# Patient Record
Sex: MALE | Race: WHITE | NOT HISPANIC OR LATINO | Employment: OTHER | URBAN - METROPOLITAN AREA
[De-identification: names, ages, dates, MRNs, and addresses within clinical notes are randomized per-mention and may not be internally consistent; named-entity substitution may affect disease eponyms.]

---

## 2019-06-07 ENCOUNTER — OFFICE VISIT CONVERTED (OUTPATIENT)
Dept: UROLOGY | Facility: CLINIC | Age: 64
End: 2019-06-07
Attending: UROLOGY

## 2019-06-07 ENCOUNTER — HOSPITAL ENCOUNTER (OUTPATIENT)
Dept: UROLOGY | Facility: CLINIC | Age: 64
Discharge: HOME | End: 2019-06-07
Attending: UROLOGY

## 2019-06-09 LAB — BACTERIA UR CULT: NORMAL

## 2019-06-10 ENCOUNTER — HOSPITAL ENCOUNTER (OUTPATIENT)
Dept: OTHER | Facility: HOSPITAL | Age: 64
Discharge: HOME | End: 2019-06-10
Attending: UROLOGY

## 2019-06-10 LAB
ALBUMIN SERPL-MCNC: 4 G/DL (ref 3.5–5)
ALBUMIN/GLOB SERPL: 1.3 {RATIO} (ref 1.4–2.6)
ALP SERPL-CCNC: 56 U/L (ref 56–155)
ALT SERPL-CCNC: 7 U/L (ref 10–40)
ANION GAP SERPL CALC-SCNC: 11 MMOL/L (ref 8–19)
AST SERPL-CCNC: 11 U/L (ref 15–50)
BASOPHILS # BLD AUTO: 0.08 10*3/UL (ref 0–0.2)
BASOPHILS NFR BLD AUTO: 0.8 % (ref 0–3)
BILIRUB SERPL-MCNC: 0.28 MG/DL (ref 0.2–1.3)
BUN SERPL-MCNC: 16 MG/DL (ref 5–25)
BUN/CREAT SERPL: 12 {RATIO} (ref 6–20)
CALCIUM SERPL-MCNC: 8.8 MG/DL (ref 8.7–10.4)
CHLORIDE SERPL-SCNC: 106 MMOL/L (ref 99–111)
CONV ABS IMM GRAN: 0.03 10*3/UL (ref 0–0.2)
CONV CO2: 26 MMOL/L (ref 22–32)
CONV IMMATURE GRAN: 0.3 % (ref 0–1.8)
CONV TOTAL PROTEIN: 7 G/DL (ref 6.3–8.2)
CREAT UR-MCNC: 1.35 MG/DL (ref 0.7–1.2)
DEPRECATED RDW RBC AUTO: 49.1 FL (ref 35.1–43.9)
EOSINOPHIL # BLD AUTO: 0.25 10*3/UL (ref 0–0.7)
EOSINOPHIL # BLD AUTO: 2.7 % (ref 0–7)
ERYTHROCYTE [DISTWIDTH] IN BLOOD BY AUTOMATED COUNT: 14 % (ref 11.6–14.4)
GFR SERPLBLD BASED ON 1.73 SQ M-ARVRAT: 55 ML/MIN/{1.73_M2}
GLOBULIN UR ELPH-MCNC: 3 G/DL (ref 2–3.5)
GLUCOSE SERPL-MCNC: 110 MG/DL (ref 70–99)
HBA1C MFR BLD: 14.3 G/DL (ref 14–18)
HCT VFR BLD AUTO: 42.3 % (ref 42–52)
LYMPHOCYTES # BLD AUTO: 2.54 10*3/UL (ref 1–5)
MCH RBC QN AUTO: 31.9 PG (ref 27–31)
MCHC RBC AUTO-ENTMCNC: 33.8 G/DL (ref 33–37)
MCV RBC AUTO: 94.4 FL (ref 80–96)
MONOCYTES # BLD AUTO: 0.78 10*3/UL (ref 0.2–1.2)
MONOCYTES NFR BLD AUTO: 8.3 % (ref 3–10)
NEUTROPHILS # BLD AUTO: 5.74 10*3/UL (ref 2–8)
NEUTROPHILS NFR BLD AUTO: 60.9 % (ref 30–85)
NRBC CBCN: 0 % (ref 0–0.7)
OSMOLALITY SERPL CALC.SUM OF ELEC: 290 MOSM/KG (ref 273–304)
PLATELET # BLD AUTO: 239 10*3/UL (ref 130–400)
PMV BLD AUTO: 9.3 FL (ref 9.4–12.4)
POTASSIUM SERPL-SCNC: 4.4 MMOL/L (ref 3.5–5.3)
PTH-INTACT SERPL-MCNC: 37.8 PG/ML (ref 11.1–79.5)
RBC # BLD AUTO: 4.48 10*6/UL (ref 4.7–6.1)
SODIUM SERPL-SCNC: 139 MMOL/L (ref 135–147)
URATE SERPL-MCNC: 6.4 MG/DL (ref 3.5–8.5)
VARIANT LYMPHS NFR BLD MANUAL: 27 % (ref 20–45)
WBC # BLD AUTO: 9.42 10*3/UL (ref 4.8–10.8)

## 2021-05-15 VITALS
BODY MASS INDEX: 31.5 KG/M2 | TEMPERATURE: 98 F | SYSTOLIC BLOOD PRESSURE: 143 MMHG | HEART RATE: 82 BPM | DIASTOLIC BLOOD PRESSURE: 86 MMHG | WEIGHT: 220 LBS | HEIGHT: 70 IN

## 2021-09-21 ENCOUNTER — OFFICE VISIT (OUTPATIENT)
Dept: CARDIOLOGY | Facility: CLINIC | Age: 66
End: 2021-09-21

## 2021-09-21 VITALS
SYSTOLIC BLOOD PRESSURE: 140 MMHG | HEART RATE: 84 BPM | BODY MASS INDEX: 32.64 KG/M2 | DIASTOLIC BLOOD PRESSURE: 76 MMHG | WEIGHT: 228 LBS | HEIGHT: 70 IN

## 2021-09-21 DIAGNOSIS — Z72.0 TOBACCO USE: ICD-10-CM

## 2021-09-21 DIAGNOSIS — Z98.61 CAD S/P PERCUTANEOUS CORONARY ANGIOPLASTY: ICD-10-CM

## 2021-09-21 DIAGNOSIS — R55 SYNCOPE AND COLLAPSE: ICD-10-CM

## 2021-09-21 DIAGNOSIS — R07.2 CHEST PAIN, PRECORDIAL: Primary | ICD-10-CM

## 2021-09-21 DIAGNOSIS — I25.10 CAD S/P PERCUTANEOUS CORONARY ANGIOPLASTY: ICD-10-CM

## 2021-09-21 PROBLEM — I10 HYPERTENSION: Status: ACTIVE | Noted: 2021-09-21

## 2021-09-21 PROBLEM — I65.29 CAROTID STENOSIS: Status: ACTIVE | Noted: 2021-09-21

## 2021-09-21 PROBLEM — J43.1 PANLOBULAR EMPHYSEMA: Status: ACTIVE | Noted: 2021-09-21

## 2021-09-21 PROBLEM — E78.5 HYPERLIPIDEMIA LDL GOAL <70: Status: ACTIVE | Noted: 2021-09-21

## 2021-09-21 PROBLEM — E66.9 CLASS 1 OBESITY: Status: ACTIVE | Noted: 2021-09-21

## 2021-09-21 PROCEDURE — 99204 OFFICE O/P NEW MOD 45 MIN: CPT | Performed by: INTERNAL MEDICINE

## 2021-09-21 PROCEDURE — 93000 ELECTROCARDIOGRAM COMPLETE: CPT | Performed by: INTERNAL MEDICINE

## 2021-09-21 RX ORDER — ALBUTEROL SULFATE 90 UG/1
2 AEROSOL, METERED RESPIRATORY (INHALATION)
COMMUNITY

## 2021-09-21 RX ORDER — CARVEDILOL 25 MG/1
25 TABLET ORAL 2 TIMES DAILY WITH MEALS
COMMUNITY
Start: 2021-08-05

## 2021-09-21 RX ORDER — IPRATROPIUM BROMIDE AND ALBUTEROL SULFATE 2.5; .5 MG/3ML; MG/3ML
3 SOLUTION RESPIRATORY (INHALATION)
COMMUNITY

## 2021-09-21 RX ORDER — SIMVASTATIN 80 MG
80 TABLET ORAL DAILY
COMMUNITY
Start: 2021-06-30

## 2021-09-21 RX ORDER — TIAGABINE HYDROCHLORIDE 2 MG/1
2 TABLET, FILM COATED ORAL DAILY
COMMUNITY
Start: 2021-08-01 | End: 2023-03-22 | Stop reason: ALTCHOICE

## 2021-09-21 RX ORDER — TRIAMCINOLONE ACETONIDE 1 MG/ML
LOTION TOPICAL
COMMUNITY

## 2021-09-21 RX ORDER — LISINOPRIL 30 MG/1
30 TABLET ORAL DAILY
COMMUNITY
Start: 2021-08-01 | End: 2023-03-22

## 2021-09-21 RX ORDER — TIOTROPIUM BROMIDE INHALATION SPRAY 3.12 UG/1
SPRAY, METERED RESPIRATORY (INHALATION)
COMMUNITY
Start: 2021-09-03 | End: 2023-03-22

## 2021-09-21 RX ORDER — CYCLOBENZAPRINE HCL 10 MG
5-10 TABLET ORAL 3 TIMES DAILY PRN
COMMUNITY
Start: 2021-07-02

## 2021-09-21 RX ORDER — AMITRIPTYLINE HYDROCHLORIDE 100 MG/1
100 TABLET, FILM COATED ORAL
COMMUNITY
Start: 2021-06-30

## 2021-09-21 RX ORDER — OMEPRAZOLE 20 MG/1
20 CAPSULE, DELAYED RELEASE ORAL DAILY
COMMUNITY
Start: 2021-06-30

## 2021-09-21 RX ORDER — ASPIRIN 81 MG/1
81 TABLET ORAL DAILY
COMMUNITY

## 2021-09-21 RX ORDER — NYSTATIN 100000 U/G
CREAM TOPICAL
COMMUNITY

## 2021-09-21 RX ORDER — NITROGLYCERIN 0.4 MG/1
0.4 TABLET SUBLINGUAL AS NEEDED
COMMUNITY

## 2021-09-21 RX ORDER — ERGOCALCIFEROL 1.25 MG/1
50000 CAPSULE ORAL
COMMUNITY

## 2021-09-21 RX ORDER — PREDNISOLONE ACETATE 10 MG/ML
SUSPENSION/ DROPS OPHTHALMIC
COMMUNITY
Start: 2021-08-02

## 2021-09-21 RX ORDER — BUSPIRONE HYDROCHLORIDE 15 MG/1
TABLET ORAL
COMMUNITY
Start: 2021-06-30

## 2021-09-21 RX ORDER — IBUPROFEN 800 MG/1
800 TABLET ORAL 3 TIMES DAILY PRN
COMMUNITY
Start: 2021-08-01

## 2021-09-21 NOTE — ASSESSMENT & PLAN NOTE
On high intensity Zocor with no myalgias symptoms will repeat lipids LFTs goal LDL less than seventy continue with his current dosing for the time being

## 2021-09-21 NOTE — ASSESSMENT & PLAN NOTE
Episode sounds vasovagal in nature will get echo and 24-hour Holter monitor for dysrhythmia assessment

## 2021-09-21 NOTE — ASSESSMENT & PLAN NOTE
Counseled on cessation patient was not interested in trying at this point to give a handout regarding information

## 2021-09-21 NOTE — PATIENT INSTRUCTIONS
Managing the Challenge of Quitting Smoking  Quitting smoking is a physical and mental challenge. You will face cravings, withdrawal symptoms, and temptation. Before quitting, work with your health care provider to make a plan that can help you manage quitting. Preparation can help you quit and keep you from giving in.  How to manage lifestyle changes  Managing stress  Stress can make you want to smoke, and wanting to smoke may cause stress. It is important to find ways to manage your stress. You might try some of the following:  · Practice relaxation techniques.  ? Breathe slowly and deeply, in through your nose and out through your mouth.  ? Listen to music.  ? Soak in a bath or take a shower.  ? Imagine a peaceful place or vacation.  · Get some support.  ? Talk with family or friends about your stress.  ? Join a support group.  ? Talk with a counselor or therapist.  · Get some physical activity.  ? Go for a walk, run, or bike ride.  ? Play a favorite sport.  ? Practice yoga.    Medicines  Talk with your health care provider about medicines that might help you deal with cravings and make quitting easier for you.  Relationships  Social situations can be difficult when you are quitting smoking. To manage this, you can:  · Avoid parties and other social situations where people might be smoking.  · Avoid alcohol.  · Leave right away if you have the urge to smoke.  · Explain to your family and friends that you are quitting smoking. Ask for support and let them know you might be a bit grumpy.  · Plan activities where smoking is not an option.  General instructions  Be aware that many people gain weight after they quit smoking. However, not everyone does. To keep from gaining weight, have a plan in place before you quit and stick to the plan after you quit. Your plan should include:  · Having healthy snacks. When you have a craving, it may help to:  ? Eat popcorn, carrots, celery, or other cut vegetables.  ? Chew  sugar-free gum.  · Changing how you eat.  ? Eat small portion sizes at meals.  ? Eat 4-6 small meals throughout the day instead of 1-2 large meals a day.  ? Be mindful when you eat. Do not watch television or do other things that might distract you as you eat.  · Exercising regularly.  ? Make time to exercise each day. If you do not have time for a long workout, do short bouts of exercise for 5-10 minutes several times a day.  ? Do some form of strengthening exercise, such as weight lifting.  ? Do some exercise that gets your heart beating and causes you to breathe deeply, such as walking fast, running, swimming, or biking. This is very important.  · Drinking plenty of water or other low-calorie or no-calorie drinks. Drink 6-8 glasses of water daily.    How to recognize withdrawal symptoms  Your body and mind may experience discomfort as you try to get used to not having nicotine in your system. These effects are called withdrawal symptoms. They may include:  · Feeling hungrier than normal.  · Having trouble concentrating.  · Feeling irritable or restless.  · Having trouble sleeping.  · Feeling depressed.  · Craving a cigarette.  To manage withdrawal symptoms:  · Avoid places, people, and activities that trigger your cravings.  · Remember why you want to quit.  · Get plenty of sleep.  · Avoid coffee and other caffeinated drinks. These may worsen some of your symptoms.  These symptoms may surprise you. But be assured that they are normal to have when quitting smoking.  How to manage cravings  Come up with a plan for how to deal with your cravings. The plan should include the following:  · A definition of the specific situation you want to deal with.  · An alternative action you will take.  · A clear idea for how this action will help.  · The name of someone who might help you with this.  Cravings usually last for 5-10 minutes. Consider taking the following actions to help you with your plan to deal with  cravings:  · Keep your mouth busy.  ? Chew sugar-free gum.  ? Suck on hard candies or a straw.  ? Brush your teeth.  · Keep your hands and body busy.  ? Change to a different activity right away.  ? Squeeze or play with a ball.  ? Do an activity or a hobby, such as making bead jewelry, practicing needlepoint, or working with wood.  ? Mix up your normal routine.  ? Take a short exercise break. Go for a quick walk or run up and down stairs.  · Focus on doing something kind or helpful for someone else.  · Call a friend or family member to talk during a craving.  · Join a support group.  · Contact a quitline.  Where to find support  To get help or find a support group:  · Call the National Cancer [place]'s Smoking Quitline: 6-394-QUIT NOW (038-0698)  · Visit the website of the Substance Abuse and Mental Health Services Administration: www.samhsa.gov  · Text QUIT to SmokefreeTXT: 575608  Where to find more information  Visit these websites to find more information on quitting smoking:  · [employer] [place]: www.smokefree.gov  · American Lung Association: www.lung.org  · American Cancer Society: www.cancer.org  · Centers for Disease Control and Prevention: www.cdc.gov  · American Heart Association: www.heart.org  Contact a health care provider if:  · You want to change your plan for quitting.  · The medicines you are taking are not helping.  · Your eating feels out of control or you cannot sleep.  Get help right away if:  · You feel depressed or become very anxious.  Summary  · Quitting smoking is a physical and mental challenge. You will face cravings, withdrawal symptoms, and temptation to smoke again. Preparation can help you as you go through these challenges.  · Try different techniques to manage stress, handle social situations, and prevent weight gain.  · You can deal with cravings by keeping your mouth busy (such as by chewing gum), keeping your hands and body busy, calling family or friends, or  contacting a quitline for people who want to quit smoking.  · You can deal with withdrawal symptoms by avoiding places where people smoke, getting plenty of rest, and avoiding drinks with caffeine.  This information is not intended to replace advice given to you by your health care provider. Make sure you discuss any questions you have with your health care provider.  Document Revised: 10/06/2020 Document Reviewed: 10/06/2020  Elsevier Patient Education © 2021 [employer]

## 2021-09-21 NOTE — PROGRESS NOTES
Chief Complaint  Chest Pain (here and there), Syncope (did not go to ER), and Coronary Artery Disease      History of Present Illness  Jf Gonzáles presents to [facility] CARDIOLOGY  Patient is a 66-year-old white male with CAD and prior stenting who has had intermittent chest discomfort left-sided described as a tightness last for a few minutes does have improvement with 1-2 nitroglycerin.  Overall the patient feels that patient pain is infrequent can occur both with and without activities.  He had an episode earlier this week in which she while sitting but like he was going to pass out okay.  This occurred while the patient was using the restroom when he got up to leave in the open the door he lost consciousness    Past Medical History:   Diagnosis Date   • Anxiety    • Asthma    • Back pain    • Cardiovascular disease    • Carotid stenosis    • Coronary artery disease    • Depression    • Hyperlipidemia    • Hypertension    • Myocardial infarction (CMS/HCC)    • Neck pain    • Renal stones    • Sleep apnea          Current Outpatient Medications:   •  albuterol sulfate  (90 Base) MCG/ACT inhaler, Inhale 2 puffs., Disp: , Rfl:   •  amitriptyline (ELAVIL) 100 MG tablet, Take 100 mg by mouth every night at bedtime., Disp: , Rfl:   •  aspirin 81 MG EC tablet, Take 81 mg by mouth Daily., Disp: , Rfl:   •  Breo Ellipta 100-25 MCG/INH inhaler, USE ONE INHALATION BY MOUTH DAILY INTO LUNGS AS DIRECTED...DISCARD 42 DAYS AFTER FOIL OPENED EVERY EVENING, Disp: , Rfl:   •  busPIRone (BUSPAR) 15 MG tablet, 4 (Four) Times a Day., Disp: , Rfl:   •  carvedilol (COREG) 25 MG tablet, Take 25 mg by mouth 2 (Two) Times a Day With Meals., Disp: , Rfl:   •  cyclobenzaprine (FLEXERIL) 10 MG tablet, Take 5-10 mg by mouth 3 (Three) Times a Day As Needed., Disp: , Rfl:   •  ergocalciferol (ERGOCALCIFEROL) 1.25 MG (92505 UT) capsule, Take 50,000 Units by mouth Every 7 (Seven) Days., Disp: , Rfl:   •  ibuprofen  "(ADVIL,MOTRIN) 800 MG tablet, Take 800 mg by mouth 3 (Three) Times a Day As Needed., Disp: , Rfl:   •  ipratropium-albuterol (DUO-NEB) 0.5-2.5 mg/3 ml nebulizer, Inhale 3 mL., Disp: , Rfl:   •  lisinopril (PRINIVIL,ZESTRIL) 30 MG tablet, Take 30 mg by mouth Daily., Disp: , Rfl:   •  nitroglycerin (NITROSTAT) 0.4 MG SL tablet, Place 0.4 mg under the tongue As Needed., Disp: , Rfl:   •  nystatin (MYCOSTATIN) 422062 UNIT/GM cream, Apply  topically to the appropriate area as directed., Disp: , Rfl:   •  omeprazole (priLOSEC) 20 MG capsule, Take 20 mg by mouth Daily., Disp: , Rfl:   •  prednisoLONE acetate (PRED FORTE) 1 % ophthalmic suspension, INSTILL 1 DROP IN LEFT EYE FOUR TIMES A DAY; SHAKE WELL, Disp: , Rfl:   •  simvastatin (ZOCOR) 80 MG tablet, Take 80 mg by mouth Daily., Disp: , Rfl:   •  Spiriva Respimat 2.5 MCG/ACT aerosol solution inhaler, INHALE TWO PUFFS BY MOUTH INTO LUNGS EVERY MORNING **USE *BREO EVERY EVENING, Disp: , Rfl:   •  tiaGABine (GABITRIL) 2 MG tablet, Take 2 mg by mouth Daily., Disp: , Rfl:   •  triamcinolone (KENALOG) 0.1 % lotion, triamcinolone acetonide 0.1 % topical lotion apply a thin layer to the affected area(s) by topical route 2 times per day   Active, Disp: , Rfl:     There are no discontinued medications.  Allergies   Allergen Reactions   • Valproic Acid Headache     Makes him mean        Social History     Tobacco Use   • Smoking status: Current Every Day Smoker     Packs/day: 1.50     Types: Cigarettes     Start date: 1966   • Smokeless tobacco: Never Used   • Tobacco comment: 1-2 PPD   Vaping Use   • Vaping Use: Never used   Substance Use Topics   • Alcohol use: Never   • Drug use: Never       Family History   Problem Relation Age of Onset   • Heart attack Mother    • Dementia Mother    • Heart disease Father    • Stroke Father    • Aneurysm Father    • Hypertension Father    • Cancer Father         Objective     /76   Pulse 84   Ht 177.8 cm (70\")   Wt 103 kg (228 lb)   " BMI 32.71 kg/m²       Physical Exam    General Appearance:   · no acute distress  · Alert and oriented x3  HENT:   · lips not cyanotic  · Atraumatic  Neck:  · No jvd   · supple  Respiratory:  · no respiratory distress  · normal breath sounds  · no rales  Cardiovascular:  · Regular rate and rhythm  · no S3, no S4   · 1/6 systolic murmur  · no rub  Extremities  · No cyanosis  · lower extremity edema: none    Skin:   · warm, dry  · No rashes    Result Review :       Creatinine 1.35  Glucose 110  Potassium 4.4  Hemoglobin 14.3       ECG 12 Lead    Date/Time: 9/21/2021 11:42 AM  Performed by: Abdoul Vanessa MD  Authorized by: Abdoul Vanessa MD   Previous ECG: no previous ECG available  Rhythm: sinus rhythm  Comments: Nonspecific T wave abnormalities                The ASCVD Risk score (Miracle KELLY Jr., et al., 2013) failed to calculate for the following reasons:    Cannot find a previous HDL lab    Cannot find a previous total cholesterol lab     Diagnoses and all orders for this visit:    1. Chest pain, precordial (Primary)  Assessment & Plan:  Patient with intermittent recurrent discomfort both exertional and nonexertional in nature recommend nonevasive and echocardiogram continue with chronic aspirin and sublingual nitroglycerin on as-needed basis.  We will base further treatment decisions upon results    Orders:  -     Stress Test With Myocardial Perfusion One Day; Future  -     Adult Transthoracic Echo Complete W/ Cont if Necessary Per Protocol; Future  -     Holter Monitor - 24 Hour; Future  -     Lipid Panel; Future  -     Hepatic Function Panel; Future    2. CAD S/P percutaneous coronary angioplasty  Overview:  Stent to mid LAD 2004    Assessment & Plan:  Chronic 81 mg strength aspirin therapy      3. Tobacco use  Assessment & Plan:  Counseled on cessation patient was not interested in trying at this point to give a handout regarding information      4. Syncope and collapse  Assessment & Plan:  Episode sounds vasovagal  in nature will get echo and 24-hour Holter monitor for dysrhythmia assessment      EKG  Normal sinus rhythm    Follow Up     Return in about 6 months (around 3/21/2022).          Patient was given instructions and counseling regarding his condition or for health maintenance advice. Please see specific information pulled into the AVS if appropriate.

## 2021-09-21 NOTE — ASSESSMENT & PLAN NOTE
Patient with intermittent recurrent discomfort both exertional and nonexertional in nature recommend nonevasive and echocardiogram continue with chronic aspirin and sublingual nitroglycerin on as-needed basis.  We will base further treatment decisions upon results

## 2021-10-04 ENCOUNTER — TELEPHONE (OUTPATIENT)
Dept: CARDIOLOGY | Facility: CLINIC | Age: 66
End: 2021-10-04

## 2021-10-04 NOTE — TELEPHONE ENCOUNTER
Called pt at the given number on his chart. The number is not working and unable to leave message.

## 2021-10-15 DIAGNOSIS — R07.2 CHEST PAIN, PRECORDIAL: ICD-10-CM

## 2021-11-04 ENCOUNTER — APPOINTMENT (OUTPATIENT)
Dept: NUCLEAR MEDICINE | Facility: HOSPITAL | Age: 66
End: 2021-11-04

## 2021-11-04 ENCOUNTER — HOSPITAL ENCOUNTER (OUTPATIENT)
Dept: NUCLEAR MEDICINE | Facility: HOSPITAL | Age: 66
End: 2021-11-04

## 2021-12-01 ENCOUNTER — APPOINTMENT (OUTPATIENT)
Dept: NUCLEAR MEDICINE | Facility: HOSPITAL | Age: 66
End: 2021-12-01

## 2022-01-26 ENCOUNTER — TELEPHONE (OUTPATIENT)
Dept: CARDIOLOGY | Facility: CLINIC | Age: 67
End: 2022-01-26

## 2022-01-26 NOTE — TELEPHONE ENCOUNTER
Received VM from patient wife stating patient having dizziness light headed and fever    SW wife. Patient with fever of 101 and coughing. Advised not heart related and needed to call PCP

## 2022-02-02 ENCOUNTER — HOSPITAL ENCOUNTER (OUTPATIENT)
Dept: CARDIOLOGY | Facility: HOSPITAL | Age: 67
Discharge: HOME | End: 2022-02-02

## 2022-02-02 DIAGNOSIS — R07.2 CHEST PAIN, PRECORDIAL: ICD-10-CM

## 2022-11-21 ENCOUNTER — TELEPHONE (OUTPATIENT)
Dept: UROLOGY | Facility: CLINIC | Age: 67
End: 2022-11-21

## 2022-11-21 NOTE — TELEPHONE ENCOUNTER
Pt was in the ER at [facility] on Friday 11/18 for kidney stones. CT in chart with mild to moderate obstructive uropathy. Pt was told to f/u asap. Please call with an appt.

## 2022-11-21 NOTE — TELEPHONE ENCOUNTER
Spoke with PT wife and confirmed that he has passed his stone. I told her that unless he wants to come to the appointment that there is no reason from our standpoint to com in to see Dr. Avery. She was ok for him not coming to the appointment. I told her if he had any other problems to call us. She voiced understanding.

## 2022-11-21 NOTE — TELEPHONE ENCOUNTER
CALLED PT TO SCHEDULE APPT ON 11/30/22 AT 10:15/PT WIFE REQUEST LATER APPT/PER PT WIFE PT PASSED HIS STONE/PLEASE ADVISE??

## 2023-03-13 ENCOUNTER — TELEPHONE (OUTPATIENT)
Dept: NEUROSURGERY | Facility: CLINIC | Age: 68
End: 2023-03-13
Payer: MEDICARE

## 2023-03-22 ENCOUNTER — OFFICE VISIT (OUTPATIENT)
Dept: CARDIOLOGY | Facility: CLINIC | Age: 68
End: 2023-03-22
Payer: MEDICARE

## 2023-03-22 VITALS
HEART RATE: 89 BPM | SYSTOLIC BLOOD PRESSURE: 117 MMHG | DIASTOLIC BLOOD PRESSURE: 72 MMHG | HEIGHT: 70 IN | WEIGHT: 232 LBS | BODY MASS INDEX: 33.21 KG/M2

## 2023-03-22 DIAGNOSIS — E78.5 HYPERLIPIDEMIA LDL GOAL <70: ICD-10-CM

## 2023-03-22 DIAGNOSIS — I10 ESSENTIAL HYPERTENSION: ICD-10-CM

## 2023-03-22 DIAGNOSIS — Z72.0 TOBACCO USE: ICD-10-CM

## 2023-03-22 DIAGNOSIS — Z98.61 CAD S/P PERCUTANEOUS CORONARY ANGIOPLASTY: Primary | ICD-10-CM

## 2023-03-22 DIAGNOSIS — I25.10 CAD S/P PERCUTANEOUS CORONARY ANGIOPLASTY: Primary | ICD-10-CM

## 2023-03-22 PROCEDURE — 3078F DIAST BP <80 MM HG: CPT | Performed by: INTERNAL MEDICINE

## 2023-03-22 PROCEDURE — 99214 OFFICE O/P EST MOD 30 MIN: CPT | Performed by: INTERNAL MEDICINE

## 2023-03-22 PROCEDURE — 3074F SYST BP LT 130 MM HG: CPT | Performed by: INTERNAL MEDICINE

## 2023-03-22 RX ORDER — LISINOPRIL 20 MG/1
20 TABLET ORAL DAILY
Qty: 90 TABLET | Refills: 3 | Status: SHIPPED | OUTPATIENT
Start: 2023-03-22

## 2023-03-22 NOTE — PROGRESS NOTES
Chief Complaint  Follow-up, Chest Pain, Hypertension, and Hyperlipidemia    Subjective    Patient has been having some intermittent chest dull achiness lasting for few minutes sometimes associated with stressful situations since being seen last.  He also reports 1 episode where his blood pressure was low diastolics in the 40s this occurred about a month ago with lightheadedness.  He does continue to smoke.    Past Medical History:   Diagnosis Date   • Anxiety    • Asthma    • Back pain    • Cardiovascular disease    • Carotid stenosis    • Coronary artery disease    • Depression    • Hyperlipidemia    • Hypertension    • Myocardial infarction (HCC)    • Neck pain    • Renal stones    • Sleep apnea          Current Outpatient Medications:   •  albuterol sulfate  (90 Base) MCG/ACT inhaler, Inhale 2 puffs., Disp: , Rfl:   •  amitriptyline (ELAVIL) 100 MG tablet, Take 1 tablet by mouth every night at bedtime., Disp: , Rfl:   •  aspirin 81 MG EC tablet, Take 1 tablet by mouth Daily., Disp: , Rfl:   •  busPIRone (BUSPAR) 15 MG tablet, 4 (Four) Times a Day., Disp: , Rfl:   •  carvedilol (COREG) 25 MG tablet, Take 1 tablet by mouth 2 (Two) Times a Day With Meals., Disp: , Rfl:   •  cyclobenzaprine (FLEXERIL) 10 MG tablet, Take 5-10 mg by mouth 3 (Three) Times a Day As Needed., Disp: , Rfl:   •  ergocalciferol (ERGOCALCIFEROL) 1.25 MG (31401 UT) capsule, Take 1 capsule by mouth Every 7 (Seven) Days., Disp: , Rfl:   •  ibuprofen (ADVIL,MOTRIN) 800 MG tablet, Take 1 tablet by mouth 3 (Three) Times a Day As Needed., Disp: , Rfl:   •  ipratropium-albuterol (DUO-NEB) 0.5-2.5 mg/3 ml nebulizer, Inhale 3 mL., Disp: , Rfl:   •  nitroglycerin (NITROSTAT) 0.4 MG SL tablet, Place 1 tablet under the tongue As Needed., Disp: , Rfl:   •  nystatin (MYCOSTATIN) 835688 UNIT/GM cream, Apply  topically to the appropriate area as directed., Disp: , Rfl:   •  omeprazole (priLOSEC) 20 MG capsule, Take 1 capsule by mouth Daily., Disp: ,  "Rfl:   •  prednisoLONE acetate (PRED FORTE) 1 % ophthalmic suspension, INSTILL 1 DROP IN LEFT EYE FOUR TIMES A DAY; SHAKE WELL, Disp: , Rfl:   •  simvastatin (ZOCOR) 80 MG tablet, Take 1 tablet by mouth Daily., Disp: , Rfl:   •  triamcinolone (KENALOG) 0.1 % lotion, triamcinolone acetonide 0.1 % topical lotion apply a thin layer to the affected area(s) by topical route 2 times per day   Active, Disp: , Rfl:   •  lisinopril (PRINIVIL,ZESTRIL) 20 MG tablet, Take 1 tablet by mouth Daily., Disp: 90 tablet, Rfl: 3    Medications Discontinued During This Encounter   Medication Reason   • Spiriva Respimat 2.5 MCG/ACT aerosol solution inhaler *Therapy completed   • Breo Ellipta 100-25 MCG/INH inhaler *Therapy completed   • tiaGABine (GABITRIL) 2 MG tablet Discontinued by another clinician   • tiaGABine (GABITRIL) 2 MG tablet Discontinued by another clinician   • lisinopril (PRINIVIL,ZESTRIL) 30 MG tablet      Allergies   Allergen Reactions   • Valproic Acid Headache     Makes him mean        Social History     Tobacco Use   • Smoking status: Every Day     Packs/day: 1.50     Types: Cigarettes     Start date: 1966   • Smokeless tobacco: Never   • Tobacco comments:     1-2 PPD   Vaping Use   • Vaping Use: Never used   Substance Use Topics   • Alcohol use: Never   • Drug use: Never       Family History   Problem Relation Age of Onset   • Heart attack Mother    • Dementia Mother    • Heart disease Father    • Stroke Father    • Aneurysm Father    • Hypertension Father    • Cancer Father         Objective     /72   Pulse 89   Ht 177.8 cm (70\")   Wt 105 kg (232 lb)   BMI 33.29 kg/m²       Physical Exam    General Appearance:   · no acute distress  · Alert and oriented x3  HENT:   · lips not cyanotic  · Atraumatic  Neck:  · No jvd   · supple  Respiratory:  · no respiratory distress  · normal breath sounds  · no rales  Cardiovascular:  · Regular rate and rhythm  · no S3, no S4   · no murmur  · no rub  Extremities  · No " cyanosis  · lower extremity edema: none    Skin:   · warm, dry  · No rashes      Result Review :     No results found for: PROBNP                     Diagnoses and all orders for this visit:    1. CAD S/P percutaneous coronary angioplasty (Primary)  Assessment & Plan:  Patient with some intermittent stress related chest discomfort recommend a noninvasive exercises further assessment.  Continue with chronic Aspir 81 milligrams once a day     Orders:  -     Stress Test With Myocardial Perfusion One Day; Future    2. Hyperlipidemia LDL goal <70  Assessment & Plan:  Continue with his chronic Zocor 80 mg a day no myalgias symptoms repeating lipids and LFTs    Orders:  -     Hepatic Function Panel; Future  -     Lipid Panel; Future    3. Tobacco use  Assessment & Plan:  Counseled on importance of smoking cessation and decreasing cardiovascular meds      4. Essential hypertension  Assessment & Plan:  Decrease his lisinopril to 20 mg qd due to some hypotensive episodes    Orders:  -     lisinopril (PRINIVIL,ZESTRIL) 20 MG tablet; Take 1 tablet by mouth Daily.  Dispense: 90 tablet; Refill: 3          Follow Up     Return in about 6 months (around 9/22/2023) for Follow with Salena James, EKG with F/U.          Patient was given instructions and counseling regarding his condition or for health maintenance advice. Please see specific information pulled into the AVS if appropriate.

## 2023-03-22 NOTE — ASSESSMENT & PLAN NOTE
Patient with some intermittent stress related chest discomfort recommend a noninvasive exercises further assessment.  Continue with chronic Aspir 81 milligrams once a day

## 2023-03-30 ENCOUNTER — PATIENT ROUNDING (BHMG ONLY) (OUTPATIENT)
Dept: NEUROSURGERY | Facility: CLINIC | Age: 68
End: 2023-03-30
Payer: MEDICARE

## 2023-03-30 ENCOUNTER — OFFICE VISIT (OUTPATIENT)
Dept: NEUROSURGERY | Facility: CLINIC | Age: 68
End: 2023-03-30
Payer: MEDICARE

## 2023-03-30 VITALS
BODY MASS INDEX: 33.9 KG/M2 | HEIGHT: 70 IN | SYSTOLIC BLOOD PRESSURE: 129 MMHG | HEART RATE: 75 BPM | DIASTOLIC BLOOD PRESSURE: 54 MMHG | WEIGHT: 236.8 LBS

## 2023-03-30 DIAGNOSIS — M43.10 ACQUIRED SPONDYLOLISTHESIS: ICD-10-CM

## 2023-03-30 DIAGNOSIS — M47.27 OSTEOARTHRITIS OF SPINE WITH RADICULOPATHY, LUMBOSACRAL REGION: ICD-10-CM

## 2023-03-30 DIAGNOSIS — Z98.890 HISTORY OF LUMBAR LAMINECTOMY: ICD-10-CM

## 2023-03-30 DIAGNOSIS — M48.062 SPINAL STENOSIS, LUMBAR REGION, WITH NEUROGENIC CLAUDICATION: Primary | ICD-10-CM

## 2023-03-30 RX ORDER — GABAPENTIN 100 MG/1
CAPSULE ORAL
COMMUNITY
Start: 2023-03-23

## 2023-03-30 RX ORDER — CETIRIZINE HYDROCHLORIDE 5 MG/1
5 TABLET ORAL DAILY
COMMUNITY

## 2023-03-30 RX ORDER — FLUTICASONE FUROATE, UMECLIDINIUM BROMIDE AND VILANTEROL TRIFENATATE 200; 62.5; 25 UG/1; UG/1; UG/1
POWDER RESPIRATORY (INHALATION)
COMMUNITY
Start: 2023-03-28

## 2023-03-30 NOTE — PROGRESS NOTES
"Chief Complaint  Back Pain (Low back pain that radiates down bilateral legs) and Extremity Weakness (legs)    Subjective          Jf Gonzáles who is a 67 y.o. year old male who presents to [facility] NEUROLOGY [facility]UROSURGERY for evaluation of lumbar spine.      The patient complains of pain located in the lumbar spine.  Patients states the pain has been present for several years.  The pain came on gradually.  Symptoms have progressed over the past year. The pain scale level is 8.  The pain does radiate. Dermatomes are located bilaterally Lumbar at: L3 and L4..  Anterior thighs to the knees and will occasionally radiate into the inferior calves. The pain is Intermittent and waxing/waning and described as aching and throbbing.  The pain is worse in the morning. Patient states prolonged standing and prolonged walking makes the pain worse.  Patient states rest makes the pain better.    Associated Symptoms Include: Numbness, Tingling and Weakness. He will have numbness and weakness in the legs when standing. Will need to sit and rest.     Conservative Interventions Include: Physical Therapy that was ineffective., Epidural Steroids that were not very effective. and Gabapentin that was not very effective.    Was this the result of an injury or accident?: No    History of Previous Spinal Surgery?: Yes.  Lumbar, Date in the 1990s, L4/5 and L5/S1.    Nicotine use: smoker  (1-1.5 ppd)    BMI: Body mass index is 33.98 kg/m².      Review of Systems   Musculoskeletal: Positive for arthralgias, back pain, gait problem and myalgias.   Neurological: Positive for weakness and numbness.   All other systems reviewed and are negative.       Objective   Vital Signs:   /54 (BP Location: Left arm)   Pulse 75   Ht 177.8 cm (70\")   Wt 107 kg (236 lb 12.8 oz)   BMI 33.98 kg/m²       Physical Exam  Vitals reviewed.   Constitutional:       Appearance: Normal appearance.   Musculoskeletal:      Lumbar back: No " tenderness. Negative right straight leg raise test and negative left straight leg raise test.      Right hip: No tenderness. Normal range of motion.      Left hip: No tenderness. Normal range of motion.   Neurological:      Mental Status: He is alert and oriented to person, place, and time.      Motor: Motor strength is normal.      Deep Tendon Reflexes:      Reflex Scores:       Patellar reflexes are 0 on the right side and 0 on the left side.       Achilles reflexes are 0 on the right side and 0 on the left side.       Neurologic Exam     Mental Status   Oriented to person, place, and time.   Level of consciousness: alert    Motor Exam   Muscle bulk: normal  Overall muscle tone: normal    Strength   Strength 5/5 throughout.     Sensory Exam   Light touch normal.     Gait, Coordination, and Reflexes     Gait  Gait: wide-based    Reflexes   Right patellar: 0  Left patellar: 0  Right achilles: 0  Left achilles: 0  Right ankle clonus: absent  Left ankle clonus: absentForward flexion at the waist        Result Review :       Data reviewed: Radiologic studies MRI Lumbar Spine on 3/13/23 at [facility] personally reviewed. Advanced multilevel spondylosis. S/P decompression at L4/5 and L5/S1. At L3/4 there is 2mm anterolisthesis with severe spinal stenosis. At L1/2 and L2/3 there is moderate spinal stenosis.          Assessment and Plan    Diagnoses and all orders for this visit:    1. Spinal stenosis, lumbar region, with neurogenic claudication (Primary)    2. Acquired spondylolisthesis  -     XR spine cervical ap and lat w flex and ext; Future    3. Osteoarthritis of spine with radiculopathy, lumbosacral region    4. History of lumbar laminectomy    Pt presenting for evaluation of low back pain with bilateral leg pain and weakness when standing and walking. We reviewed his MRI lumbar spine, demonstrating multilevel spondylosis with spondylolisthesis and severe spinal stenosis at L3/4. He is demonstrating symptoms of  neurogenic claudication. Will proceed with bending XRs and follow up in 4 weeks for surgical consult.     We discussed the importance of smoking/nicotine cessation. Smoking/nicotine use has multiple health risks. In particular related to the spine, nicotine increases the incidence of lower back pain, speeds up the progression of degenerative disc disease and dramatically reduces healing after spine surgery (particularly a fusion operation).       Follow Up   Return in about 4 weeks (around 4/27/2023).  Patient was given instructions and counseling regarding his condition or for health maintenance advice.     -XR bending views  -Follow up in 4 weeks

## 2023-04-10 DIAGNOSIS — M43.10 ACQUIRED SPONDYLOLISTHESIS: Primary | ICD-10-CM

## 2023-04-10 DIAGNOSIS — M43.10 ACQUIRED SPONDYLOLISTHESIS: ICD-10-CM

## 2023-04-12 ENCOUNTER — TELEPHONE (OUTPATIENT)
Dept: NEUROSURGERY | Facility: CLINIC | Age: 68
End: 2023-04-12
Payer: MEDICARE

## 2023-04-27 ENCOUNTER — TELEPHONE (OUTPATIENT)
Dept: NEUROSURGERY | Facility: CLINIC | Age: 68
End: 2023-04-27

## 2023-04-27 NOTE — TELEPHONE ENCOUNTER
LVM THAT TODAY'S APPOINTMENT HAS BEEN CANCELED AND TO CALL [phone] TO RESCHEDULE OR TO SEND A [employer] MESSAGE AND APPOINTMENT CAN BE RESCHEDULED.

## 2023-05-18 ENCOUNTER — OFFICE VISIT (OUTPATIENT)
Dept: NEUROSURGERY | Facility: CLINIC | Age: 68
End: 2023-05-18
Payer: MEDICARE

## 2023-05-18 VITALS
BODY MASS INDEX: 34.17 KG/M2 | WEIGHT: 238.7 LBS | DIASTOLIC BLOOD PRESSURE: 62 MMHG | HEART RATE: 71 BPM | SYSTOLIC BLOOD PRESSURE: 123 MMHG | HEIGHT: 70 IN

## 2023-05-18 DIAGNOSIS — M47.27 OSTEOARTHRITIS OF SPINE WITH RADICULOPATHY, LUMBOSACRAL REGION: ICD-10-CM

## 2023-05-18 DIAGNOSIS — M48.062 SPINAL STENOSIS, LUMBAR REGION, WITH NEUROGENIC CLAUDICATION: Primary | ICD-10-CM

## 2023-05-18 DIAGNOSIS — Z98.890 HISTORY OF LUMBAR LAMINECTOMY: ICD-10-CM

## 2023-05-18 NOTE — PROGRESS NOTES
Chief Complaint  Follow-up (Discuss XR L)    Subjective          Jf Gonzáles who is a 67 y.o. year old male who presents to [facility] NEUROLOGY & NEUROSURGERY for follow up of low back pain.     History of Present Illness  Pt had bending XRs which did not show in movement in flexion/extension.    MRI Lumbar Spine on 3/13/23 at [place] personally reviewed. Advanced multilevel spondylosis. S/P decompression at L4/5 and L5/S1. At L3/4 there is 2mm anterolisthesis with severe spinal stenosis. At L1/2 and L2/3 there is moderate spinal stenosis.      His pain is most significant in the low back. He rates his pain a 5/10 today. Will be severe with prolonged standing and walking. He has radiating pain into the thighs, typically stopping at the knees. He has concerns of weakness in the legs with prolonged standing, the legs will go numb and he will need to sit and rest.           Interval History      Jf Gonzáles who is a 67 y.o. year old male who presents to [facility] NEUROLOGY & NEUROSURGERY for evaluation of lumbar spine.        The patient complains of pain located in the lumbar spine.  Patients states the pain has been present for several years.  The pain came on gradually.  Symptoms have progressed over the past year. The pain scale level is 8.  The pain does radiate. Dermatomes are located bilaterally Lumbar at: L3 and L4..  Anterior thighs to the knees and will occasionally radiate into the inferior calves. The pain is Intermittent and waxing/waning and described as aching and throbbing.  The pain is worse in the morning. Patient states prolonged standing and prolonged walking makes the pain worse.  Patient states rest makes the pain better.     Associated Symptoms Include: Numbness, Tingling and Weakness. He will have numbness and weakness in the legs when standing. Will need to sit and rest.      Conservative Interventions Include: Physical Therapy that was ineffective.,  "Epidural Steroids that were not very effective. and Gabapentin that was not very effective.     Was this the result of an injury or accident?: No     History of Previous Spinal Surgery?: Yes.  Lumbar, Date in the 1990s, L4/5 and L5/S1.     Nicotine use: smoker  (1-1.5 ppd)     BMI: Body mass index is 33.98 kg/m².    Recent Interventions: LESI, PT, Gabapentin      Review of Systems   Musculoskeletal: Positive for arthralgias, back pain, gait problem and myalgias.   Neurological: Positive for weakness and numbness.   All other systems reviewed and are negative.       Objective   Vital Signs:   /62 (BP Location: Left arm, Patient Position: Sitting, Cuff Size: Large Adult)   Pulse 71   Ht 177.8 cm (70\")   Wt 108 kg (238 lb 11.2 oz)   BMI 34.25 kg/m²       Physical Exam  Vitals reviewed.   Constitutional:       Appearance: Normal appearance.   Musculoskeletal:      Lumbar back: No tenderness. Negative right straight leg raise test and negative left straight leg raise test.      Right hip: No tenderness. Normal range of motion.      Left hip: No tenderness. Normal range of motion.   Neurological:      Mental Status: He is alert and oriented to person, place, and time.      Motor: Motor strength is normal.      Deep Tendon Reflexes:      Reflex Scores:       Patellar reflexes are 0 on the right side and 0 on the left side.       Achilles reflexes are 0 on the right side and 0 on the left side.       Neurologic Exam     Mental Status   Oriented to person, place, and time.   Level of consciousness: alert    Motor Exam   Muscle bulk: normal  Overall muscle tone: normal    Strength   Strength 5/5 throughout.     Sensory Exam   Light touch normal.     Gait, Coordination, and Reflexes     Gait  Gait: wide-based    Reflexes   Right patellar: 0  Left patellar: 0  Right achilles: 0  Left achilles: 0  Right ankle clonus: absent  Left ankle clonus: absentForward flexion at the waist        Result Review :       Data " reviewed: Radiologic studies MRI Lumbar Spine on 3/13/23 at [place] personally reviewed. Advanced multilevel spondylosis. S/P decompression at L4/5 and L5/S1. At L3/4 there is 2mm anterolisthesis with severe spinal stenosis. At L1/2 and L2/3 there is moderate spinal stenosis.           Assessment and Plan    Diagnoses and all orders for this visit:    1. Spinal stenosis, lumbar region, with neurogenic claudication (Primary)  -     Case Request; Standing  -     Follow Anesthesia Guidelines / Protocol; Standing  -     Obtain informed consent; Standing  -     SCD (sequential compression device)- to be placed on patient in Pre-op; Standing  -     Verify / Perform Chlorhexidine Skin Prep; Standing  -     ceFAZolin (ANCEF) 2 g in sodium chloride 0.9 % 100 mL IVPB  -     Case Request    2. Osteoarthritis of spine with radiculopathy, lumbosacral region    3. History of lumbar laminectomy    Dr. Santana reviewed his MRI Lumbar Spine. Pt has severe spinal stenosis at L3/4. Dr. Santana discussed open laminectomy L3/4. Risks and benefits discussed. Discussed at length that surgery would only improve leg pain. Would not provide much benefit for back pain. Pt wishes to proceed. He will follow up at post op.       Follow Up   Return for Post-op Follow up.  Patient was given instructions and counseling regarding his condition or for health maintenance advice.       He has multilevel DDD. He has had surgery from L3-S1. He has residual or recurrent narrowing at L3-4. He could consider L3-4 open laminectomy for the leg pain. This wouldn't help the lower back pain.

## 2023-05-31 PROBLEM — M48.062 SPINAL STENOSIS, LUMBAR REGION, WITH NEUROGENIC CLAUDICATION: Status: ACTIVE | Noted: 2023-05-31

## 2023-06-26 PROBLEM — M48.062 SPINAL STENOSIS, LUMBAR REGION, WITH NEUROGENIC CLAUDICATION: Status: RESOLVED | Noted: 2023-05-31 | Resolved: 2023-06-26

## 2023-06-26 PROBLEM — M48.062 SPINAL STENOSIS, LUMBAR REGION, WITH NEUROGENIC CLAUDICATION: Status: ACTIVE | Noted: 2023-06-26

## 2023-06-27 PROBLEM — M48.062 SPINAL STENOSIS, LUMBAR REGION, WITH NEUROGENIC CLAUDICATION: Status: RESOLVED | Noted: 2023-06-26 | Resolved: 2023-06-27

## 2023-07-03 ENCOUNTER — TELEPHONE (OUTPATIENT)
Dept: NEUROLOGY | Facility: CLINIC | Age: 68
End: 2023-07-03

## 2023-07-03 NOTE — TELEPHONE ENCOUNTER
Patient wife called states pt had surgery on 6/26 said she changed his bandage and has some questions. Please advise

## 2023-07-03 NOTE — TELEPHONE ENCOUNTER
Spoke to pts wife. She said there was some brown fluid on his dressing today, and there is more redness around surgical site. She is going to send pictures.

## 2023-07-27 ENCOUNTER — OFFICE VISIT (OUTPATIENT)
Dept: NEUROSURGERY | Facility: CLINIC | Age: 68
End: 2023-07-27
Payer: MEDICARE

## 2023-07-27 VITALS
DIASTOLIC BLOOD PRESSURE: 64 MMHG | SYSTOLIC BLOOD PRESSURE: 118 MMHG | BODY MASS INDEX: 34.71 KG/M2 | WEIGHT: 229 LBS | HEIGHT: 68 IN

## 2023-07-27 DIAGNOSIS — T81.49XA SUPERFICIAL POSTOPERATIVE WOUND INFECTION: ICD-10-CM

## 2023-07-27 DIAGNOSIS — Z98.890 HISTORY OF LUMBAR LAMINECTOMY: Primary | ICD-10-CM

## 2023-07-27 PROCEDURE — 99024 POSTOP FOLLOW-UP VISIT: CPT | Performed by: NEUROLOGICAL SURGERY

## 2023-07-27 PROCEDURE — 1159F MED LIST DOCD IN RCRD: CPT | Performed by: NEUROLOGICAL SURGERY

## 2023-07-27 PROCEDURE — 3078F DIAST BP <80 MM HG: CPT | Performed by: NEUROLOGICAL SURGERY

## 2023-07-27 PROCEDURE — 3074F SYST BP LT 130 MM HG: CPT | Performed by: NEUROLOGICAL SURGERY

## 2023-07-27 PROCEDURE — 1160F RVW MEDS BY RX/DR IN RCRD: CPT | Performed by: NEUROLOGICAL SURGERY

## 2023-07-27 NOTE — PROGRESS NOTES
Jf Gonzáles is a 68 y.o. male that presents with Status post lumbar laminectomy       He is overall improved. He has had greatly reduced drainage.       Review of Systems     Vitals:    07/27/23 0952   BP: 118/64        Physical Exam  Skin:     Comments: His incision looks better. No drainage at present. No erythema.   Neurological:      Mental Status: He is alert.           Assessment and Plan {CC Problem List  Visit Diagnosis  ROS  Review (Popup)  Health Maintenance  Quality  BestPractice  Medications  SmartSets  SnapShot Encounters  Media :23}   Problem List Items Addressed This Visit    None  Visit Diagnoses       History of lumbar laminectomy    -  Primary    Superficial postoperative wound infection              He will increase walking as tolerated and f/u in 1 month or sooner with any incision changes.  Follow Up {Instructions Charge Capture  Follow-up Communications :23}   Return in about 1 month (around 8/27/2023).

## 2023-08-04 ENCOUNTER — TELEPHONE (OUTPATIENT)
Dept: NEUROLOGY | Facility: CLINIC | Age: 68
End: 2023-08-04

## 2023-08-04 NOTE — TELEPHONE ENCOUNTER
Patient's wife called states patient had surgery with dr archuleta, patient states his hip is hurting real bad and wants to know if this has anything to do with the surgery please advise. [phone]

## 2023-08-07 NOTE — TELEPHONE ENCOUNTER
Patient wife called, gave him an appt 8/10 @ 8:45, she wants to know if he can have some pain meds called in or do he have to wait for appt. Please advise

## 2023-08-08 RX ORDER — HYDROCODONE BITARTRATE AND ACETAMINOPHEN 5; 325 MG/1; MG/1
1 TABLET ORAL EVERY 8 HOURS PRN
Qty: 20 TABLET | Refills: 0 | Status: SHIPPED | OUTPATIENT
Start: 2023-08-08

## 2023-08-10 ENCOUNTER — OFFICE VISIT (OUTPATIENT)
Dept: NEUROSURGERY | Facility: CLINIC | Age: 68
End: 2023-08-10
Payer: MEDICARE

## 2023-08-10 VITALS
HEIGHT: 68 IN | DIASTOLIC BLOOD PRESSURE: 60 MMHG | BODY MASS INDEX: 34.51 KG/M2 | WEIGHT: 227.7 LBS | SYSTOLIC BLOOD PRESSURE: 121 MMHG | HEART RATE: 80 BPM

## 2023-08-10 DIAGNOSIS — Z98.890 STATUS POST LUMBAR LAMINECTOMY: Primary | ICD-10-CM

## 2023-08-10 DIAGNOSIS — T81.49XA SUPERFICIAL POSTOPERATIVE WOUND INFECTION: ICD-10-CM

## 2023-08-10 RX ORDER — CEPHALEXIN 500 MG/1
500 CAPSULE ORAL 3 TIMES DAILY
Qty: 21 CAPSULE | Refills: 0 | Status: SHIPPED | OUTPATIENT
Start: 2023-08-10

## 2023-08-10 NOTE — PROGRESS NOTES
Jf Gonzáles is a 68 y.o. male that presents with Follow-up (Follow up on wound, and right hip pain)       He had a flare up of hip pain after walking more than typical. It has improved somewhat from its worst. He had some incisional drainage about 4 days ago. No fever, but some chills. He has a scab over the lower area of the incision.          Vitals:    08/10/23 0837   BP: 121/60   Pulse: 80      Small eschar at the bottom of the incision. No drainage or notable erythema.  Strength well preserved.  No notable tenderness.       Assessment and Plan {CC Problem List  Visit Diagnosis  ROS  Review (Popup)  Health Maintenance  Quality  BestPractice  Medications  SmartSets  SnapShot Encounters  Media :23}   Problem List Items Addressed This Visit    None  Visit Diagnoses       Status post lumbar laminectomy    -  Primary    Superficial postoperative wound infection        Relevant Medications    cephalexin (Keflex) 500 MG capsule        Added week of Keflex, keep scheduled f/u. Call with any wound changes.    Follow Up {Instructions Charge Capture  Follow-up Communications :23}   No follow-ups on file.

## 2023-08-24 ENCOUNTER — OFFICE VISIT (OUTPATIENT)
Dept: NEUROSURGERY | Facility: CLINIC | Age: 68
End: 2023-08-24
Payer: MEDICARE

## 2023-08-24 VITALS
HEIGHT: 68 IN | BODY MASS INDEX: 34.69 KG/M2 | DIASTOLIC BLOOD PRESSURE: 62 MMHG | WEIGHT: 228.9 LBS | SYSTOLIC BLOOD PRESSURE: 112 MMHG

## 2023-08-24 DIAGNOSIS — T81.49XA SUPERFICIAL POSTOPERATIVE WOUND INFECTION: ICD-10-CM

## 2023-08-24 DIAGNOSIS — Z98.890 HISTORY OF LUMBAR LAMINECTOMY: Primary | ICD-10-CM

## 2023-08-24 NOTE — PROGRESS NOTES
Jf Gonzáles is a 68 y.o. male that presents with Back Pain       He has had no drainage since a couple of days since his last visit. He has had no recent hip pain.     Back Pain      Review of Systems   Musculoskeletal:  Positive for back pain.      Vitals:    08/24/23 1012   BP: 112/62        Physical Exam  Skin:     Comments: WHSS with no erythema or drainage.   Neurological:      Mental Status: He is alert.      Motor: No weakness.   Psychiatric:         Mood and Affect: Mood normal.           Assessment and Plan {CC Problem List  Visit Diagnosis  ROS  Review (Popup)  [facility] Maintenance  Quality  BestPractice  Medications  SmartSets  SnapShot Encounters  Media :23}   Problem List Items Addressed This Visit    None  Visit Diagnoses       History of lumbar laminectomy    -  Primary    Superficial postoperative wound infection            His incision looks good and he is having no pain into the hip. He will increase his activity as tolerated and f/u here with any worsened pain.     Follow Up {Instructions Charge Capture  Follow-up Communications :23}   No follow-ups on file.

## 2023-09-15 ENCOUNTER — TELEPHONE (OUTPATIENT)
Dept: NEUROSURGERY | Facility: CLINIC | Age: 68
End: 2023-09-15
Payer: MEDICARE

## 2023-09-15 NOTE — TELEPHONE ENCOUNTER
Caller:DOREEN ABRAHAM    Relationship: PTS WIFE    Best call back number:[phone]    What is the best time to reach you: ANYTIME    Who are you requesting to speak with (clinical staff, provider,  specific staff member): CLINICAL STAFF    Do you know the name of the person who called: NA    What was the call regarding:PTS WIFE CALLED AND STATES THAT PT WAS IN ER AND HAS EXTREME PAIN-THEY ARE WANTING TO TALK TO SOMEONE ABOUT THIS-SENDING AS HIGH PRIORITY-THANK YOU    Is it okay if the provider responds through MyChart: NO

## 2023-09-18 NOTE — TELEPHONE ENCOUNTER
Patient is no longer having extreme pain in back. He is having some pain in his neck now and would like to see Dr. Santana for that.

## 2023-09-19 ENCOUNTER — PREP FOR SURGERY (OUTPATIENT)
Dept: OTHER | Facility: HOSPITAL | Age: 68
End: 2023-09-19
Payer: MEDICARE

## 2023-09-19 DIAGNOSIS — N20.1 URETERAL STONE: Primary | ICD-10-CM

## 2023-09-19 RX ORDER — SODIUM CHLORIDE 9 MG/ML
40 INJECTION, SOLUTION INTRAVENOUS AS NEEDED
Status: CANCELLED | OUTPATIENT
Start: 2023-09-19

## 2023-09-19 RX ORDER — SODIUM CHLORIDE 9 MG/ML
100 INJECTION, SOLUTION INTRAVENOUS CONTINUOUS
Status: CANCELLED | OUTPATIENT
Start: 2023-09-19

## 2023-09-19 RX ORDER — SODIUM CHLORIDE 0.9 % (FLUSH) 0.9 %
10 SYRINGE (ML) INJECTION EVERY 12 HOURS SCHEDULED
Status: CANCELLED | OUTPATIENT
Start: 2023-09-19

## 2023-09-19 RX ORDER — LEVOFLOXACIN 5 MG/ML
500 INJECTION, SOLUTION INTRAVENOUS ONCE
Status: CANCELLED | OUTPATIENT
Start: 2023-09-19 | End: 2023-09-19

## 2023-09-19 RX ORDER — SODIUM CHLORIDE 0.9 % (FLUSH) 0.9 %
10 SYRINGE (ML) INJECTION AS NEEDED
Status: CANCELLED | OUTPATIENT
Start: 2023-09-19

## 2023-09-19 NOTE — H&P
[facility]   Urology HISTORY AND PHYSICAL    Patient Name: Jf Gonzáles  : 1955  MRN: [id number]  Primary Care Physician:  Clint Marin MD  Date of admission: (Not on file)    Subjective   Subjective       History of Present Illness  Patient has a distal left ureteral stone and fever and presents for cystoscopy and left ureteral stent placement.       Personal History     Past Medical History:   Diagnosis Date    Aneurysm     aortic- see's PCP, being monitored- Aorta measures:  Proximal 2.6 cm.  Middle 2.5 cm.  Distal 3.1  cm.  Aorta measure transversely:  Proximal 2.8 cm.  Middle 2.8 cm.  Distal  2.8  cm.    Anxiety     Arthritis     Asthma     Back pain     Cardiovascular disease     Carotid stenosis     Claustrophobia     COPD (chronic obstructive pulmonary disease)     uses inhalers    Coronary artery disease     see's [facility]ia del [facility]son cp/sob    Depression     Emphysema lung     Heart attack     1 STENTS PLACED    Hyperlipidemia     Hypertension     Low back pain     Lumbosacral disc disease     Myocardial infarction     Neck pain     Nervousness     Renal stones     Sleep apnea        Past Surgical History:   Procedure Laterality Date    BACK SURGERY      LUMBAR    CARDIAC CATHETERIZATION      1 STENT    COLONOSCOPY      CORONARY ANGIOPLASTY WITH STENT PLACEMENT      LUMBAR LAMINECTOMY N/A 2023    Procedure: LUMBAR LAMINECTOMY, Open, lumbar 3-lumbar 4;  Surgeon: Gustavo Santana MD;  [facility];  Service: Neurosurgery;  Laterality: N/A;    TEETH EXTRACTION      WISDOM TOOTH EXTRACTION         Family History: family history includes Aneurysm in his father; Arthritis in his mother; Cancer in his father; Dementia in his mother; Heart attack in his mother; Heart disease in his father; Hypertension in his father; Stroke in his father. Otherwise pertinent FHx was reviewed and not pertinent to current issue.    Social History:  reports that he has been smoking  cigarettes. He started smoking about 57 years ago. He has a 57.00 pack-year smoking history. He has never used smokeless tobacco. He reports that he does not drink alcohol and does not use drugs.    Home Medications:  Fluticasone-Umeclidin-Vilant, HYDROcodone-acetaminophen, albuterol sulfate HFA, amitriptyline, busPIRone, carvedilol, cyclobenzaprine, gabapentin, ipratropium-albuterol, nitroglycerin, omeprazole, simvastatin, and triamcinolone    Allergies:  Allergies   Allergen Reactions    Valproic Acid Headache     Makes him mean       Objective    Objective     Vitals:        Physical Exam  Constitutional:       Appearance: Normal appearance.   Cardiovascular:      Rate and Rhythm: Normal rate and regular rhythm.   Pulmonary:      Effort: Pulmonary effort is normal.      Breath sounds: Normal breath sounds.   Neurological:      Mental Status: He is alert. Mental status is at baseline.   Psychiatric:         Mood and Affect: Mood and affect normal.         Speech: Speech normal.         Judgment: Judgment normal.       Result Review    Result Review:  I have personally reviewed the results from the time of this admission to 9/19/2023 11:08 EDT and agree with these findings:  [x]  Laboratory  []  Microbiology  [x]  Radiology  []  EKG/Telemetry   []  Cardiology/Vascular   []  Pathology  [x]  Old records  []  Other:      Assessment & Plan   Assessment / Plan       Active Hospital Problems:  There are no active hospital problems to display for this patient.      Plan: cystoscopy and left ureteral stent placement  Risks and benefits discussed with patient and they are agreeable to proceed.    DVT prophylaxis:  No DVT prophylaxis order currently exists.    CODE STATUS:           Electronically signed by Cele Avery MD, 09/19/23, 11:08 AM EDT.

## 2024-02-05 DIAGNOSIS — I10 ESSENTIAL HYPERTENSION: ICD-10-CM

## 2024-02-06 RX ORDER — LISINOPRIL 20 MG/1
20 TABLET ORAL DAILY
Qty: 90 TABLET | Refills: 0 | Status: SHIPPED | OUTPATIENT
Start: 2024-02-06

## 2024-02-06 NOTE — TELEPHONE ENCOUNTER
MEDICATION SENT TO THE PHARMACY PER TRACY GUERRIER.  SW PT WIFE.  ADVISED HER PT NEEDS A F/U APPT.  SHE ASKED PT IF HE WOULD LIKE TO SCHEDULE F/U APPT AND HE SAID NO.

## 2024-02-06 NOTE — TELEPHONE ENCOUNTER
MEDICATION IS NOT ON CURRENT MED LIST.  PHARMACY STATED PT LAST PICKED UP RX 10/23/23. PLEASE ADVISE